# Patient Record
Sex: FEMALE | Race: WHITE
[De-identification: names, ages, dates, MRNs, and addresses within clinical notes are randomized per-mention and may not be internally consistent; named-entity substitution may affect disease eponyms.]

---

## 2017-04-17 ENCOUNTER — HOSPITAL ENCOUNTER (OUTPATIENT)
Dept: HOSPITAL 35 - PAIN | Age: 52
Discharge: HOME | End: 2017-04-17
Attending: ANESTHESIOLOGY
Payer: COMMERCIAL

## 2017-04-17 VITALS — WEIGHT: 165.6 LBS | HEIGHT: 64.02 IN | BODY MASS INDEX: 28.27 KG/M2

## 2017-04-17 VITALS — SYSTOLIC BLOOD PRESSURE: 142 MMHG | DIASTOLIC BLOOD PRESSURE: 86 MMHG

## 2017-04-17 DIAGNOSIS — M54.16: Primary | ICD-10-CM

## 2017-04-17 DIAGNOSIS — M79.1: ICD-10-CM

## 2017-04-17 DIAGNOSIS — M25.571: ICD-10-CM

## 2017-12-11 ENCOUNTER — HOSPITAL ENCOUNTER (OUTPATIENT)
Dept: HOSPITAL 35 - PAIN | Age: 52
End: 2017-12-11
Attending: ANESTHESIOLOGY
Payer: COMMERCIAL

## 2017-12-11 VITALS — SYSTOLIC BLOOD PRESSURE: 128 MMHG | DIASTOLIC BLOOD PRESSURE: 80 MMHG

## 2017-12-11 VITALS — WEIGHT: 165 LBS | HEIGHT: 64.02 IN | BODY MASS INDEX: 28.17 KG/M2

## 2017-12-11 DIAGNOSIS — M54.16: Primary | ICD-10-CM

## 2017-12-11 DIAGNOSIS — M25.571: ICD-10-CM

## 2017-12-11 DIAGNOSIS — Z79.899: ICD-10-CM

## 2018-07-20 ENCOUNTER — HOSPITAL ENCOUNTER (OUTPATIENT)
Dept: HOSPITAL 35 - PAIN | Age: 53
End: 2018-07-20
Payer: COMMERCIAL

## 2018-07-20 VITALS — HEIGHT: 64.02 IN | BODY MASS INDEX: 27.93 KG/M2 | WEIGHT: 163.6 LBS

## 2018-07-20 VITALS — DIASTOLIC BLOOD PRESSURE: 93 MMHG | SYSTOLIC BLOOD PRESSURE: 136 MMHG

## 2018-07-20 DIAGNOSIS — M54.5: ICD-10-CM

## 2018-07-20 DIAGNOSIS — E03.9: Primary | ICD-10-CM

## 2018-07-20 DIAGNOSIS — M79.1: ICD-10-CM

## 2019-02-08 ENCOUNTER — HOSPITAL ENCOUNTER (OUTPATIENT)
Dept: HOSPITAL 35 - PAIN | Age: 54
End: 2019-02-08
Payer: COMMERCIAL

## 2019-02-08 VITALS — DIASTOLIC BLOOD PRESSURE: 83 MMHG | SYSTOLIC BLOOD PRESSURE: 128 MMHG

## 2019-02-08 VITALS — WEIGHT: 164 LBS | HEIGHT: 64.02 IN | BODY MASS INDEX: 28 KG/M2

## 2019-02-08 DIAGNOSIS — E03.9: ICD-10-CM

## 2019-02-08 DIAGNOSIS — M62.838: ICD-10-CM

## 2019-02-08 DIAGNOSIS — Z80.8: ICD-10-CM

## 2019-02-08 DIAGNOSIS — M54.16: Primary | ICD-10-CM

## 2019-02-08 DIAGNOSIS — Z79.899: ICD-10-CM

## 2019-02-08 NOTE — NUR
Pain Clinic Assessment:
 
1. History of Osteoarthritis:
Not Applicable
   History of Rheumatoid Arthritis:
Not Applicable
 
2. Height: 5 ft. 4 in. 162.6 cm.
   Weight: 164.0 lb.  oz. 74.390 kg.
   Patient's BMI: 28.1
 
3. Vital Signs:
   BP: 128/83 Pulse: 86 Resp: 14
   Temp:  02 Sat: 99 ECG Mon:
 
4. Pain Intensity: 5-6 TODAY
 
5. Fall Risk:
   Dizziness: N  Needs help standing or walking: N
   Fallen in the last 3 months: N
   Fall risk comments:
 
 
6. Patient on Blood Thinner: None
 
7. History of Hypertension: N
 
8. Opioid Therapy greater than 6 weeks: Y
   Opiate Contract Signed:
 
9. Risk Assessment Tool Provided: LOW RISK 1/3
 
10. Functional Assessment Tool: 43/70
 
11. Recreational Drug Use: Never Drug Type:
    Tobacco Use: Never Smoker Tobacco Type:
       Amount or Packs/day:  How Many Years:
    Alcohol Use: Yes  Frequency:  Quant:

## 2019-02-13 NOTE — HPC
[facility]
[address]
[place]   [address]                     PAIN MANAGEMENT CONSULTATION  
_______________________________________________________________________________
 
Name:       URBI LEVINEANGELA            Room #:                     REG YURI 
ADELINA#:      [id number]                       Account #:      [id number]  
Admission:  02/08/19    Attend Phys:    MAYUR Best MD    
Discharge:              Date of Birth:  [birth year]  
                                                          Report #: [id number]
                                                                    [id number]   
_______________________________________________________________________________
THIS REPORT FOR:   //name//                          
 
CC: DILCIA Best
    Physician staff
 
DATE OF SERVICE:  02/08/2019
 
 
CHIEF COMPLAINT:  Here for medication renewal.  I am having pain down in my
sciatic nerve.
 
FOLLOWUP HISTORY:  The patient is a 53-year-old female.  She has been followed
in the pain clinic because of chronic pain.  She has been experiencing her low
back and has noted increased pain in the right groin area.  Pain is radiating
down into the buttocks, posterior thigh and down into the lateral portion of her
foot.  Describes as a burning, constant, shooting, rates it as a 5-6 at this
point.  Pain is exacerbated with prolonged sitting, walking or lying on her left
side, improves with stretching as well as with her medications.  Feels that her
medications of tramadol, cyclobenzaprine, Voltaren gel and Mobic continue to be
efficacious.  She would like to have these medications renewed.  She has been
following her 's health is quite closely.  She developed a nodule in the
roof of his mouth had a positive nodule on his neck.  She received chemotherapy
for a number of months for this.  Things at this point seem to be doing
reasonably well.
 
ALLERGIES:  No known drug allergies.
 
CURRENT MEDICATIONS:  Flexeril 10 mg b.i.d., tramadol 50 mg, meloxicam 15 mg,
Voltaren 100 mg t.i.d., Synthroid 0.088 mcg, Ambien 12.5 mg, triamcinolone nasal
spray, Claritin 10 mg, vitamin B12 100 mcg, Effexor 150 mg.
 
PAIN CLINIC ASSESSMENT/PQRS:
1.  The patient is not being treated for osteoarthritis.  She has not been
treated for rheumatoid arthritis.
2.  Height 5 feet 4 inches, weight 164 pounds, BMI is 28.
3.  Vital signs:  Blood pressure 128/83, pulse 86, respiratory rate 14, room air
saturation 99%.
4.  Pain intensity 5-6/10.
5.  Fall risk.  The patient has not fallen in the last 3 months.
6.  Blood thinner.  The patient is not on a blood thinning medication.
7.  Hypertension.  The patient is not being treated for hypertension.
8.  Opioid greater than 6 weeks.  The patient received some medications and use
tramadol to help control her pain.
9.  Risk assessment tool 1/3 low risk for opioid use.
10.  Functional assessment tool 43/70, recreational drug use.  The patient
 
 
 
[place] [address]                     PAIN MANAGEMENT CONSULTATION  
_______________________________________________________________________________
 
Name:       RUBI LEVINE            Room #:                     REG [facility] 
ADELINA#:      [id number]                       Account #:      [id number]  
Admission:  02/08/19    Attend Phys:    MAYUR Best MD    
Discharge:              Date of Birth:  [birth year]  
                                                          Report #: [id number]
                                                                    [id number]   
_______________________________________________________________________________
denies use of recreational drugs.
11.  Tobacco:  The patient has never smoked.
12.  Alcohol:  The patient drinks alcoholic beverages on rare occasion.
 
PHYSICAL EXAMINATION:
GENERAL:  The patient is a well-developed, well-nourished white female.  Appears
her stated age.  She is alert and oriented x 3.  Her affect is appropriate. 
Speech is fluent.
HEENT:  Normocephalic, atraumatic.  Extraocular eye muscles intact.  Sclerae
nonicteric.  Mucous membranes are moist.
NECK:  Without adenopathy or JVD.
HEART:  Regular.
ABDOMEN:  Nontender.  Bowel sounds present.
EXTREMITIES:  Upper extremity muscle strength is judged to be 5/5 for the major
muscle groups in the upper extremity.  The patient without significant
scoliosis, kyphosis or lordosis.  
She is experiencing pain and discomfort in the L5-S1 dermatomal distribution on
the right with pain that is radiating down her buttocks, posterior portion of
her thigh, calf area and lateral portion of her foot.
 
IMPRESSION:
1.  History of lumbar radicular pain, L5-S1 dermatomal distribution.
2.  Myofascial pain involving the right ankle.
3.  Muscle spasms.
4.  Hypothyroidism.
5.  Allergies.
6.   with recent chemotherapy for malignancy (lymphoma).
 
RECOMMENDATIONS:  We discussed treatment options with the patient.  At this
juncture, she continues to have pain, which is quite problematic involving the
right L5-S1 dermatomal distribution with sciatic pain.  She goes from a sitting
to a standing position during our interview.  States that she can only sit for
so long because of the pain and from her sciatic nerve.  At this juncture, we
will try a conservative approach.  The patient will be given a Medrol Dosepak,
which she will take at this juncture.  Hopefully, it helps with decreasing the
pain and discomfort.  If she should need in the future, she could return at
which time we could proceed with an epidural steroid injection to help quell the
sciatic pain involving her low back.  A script for her medications have been
rewritten.  She will continue with tramadol 50 mg one p.o. q.6 hours p.r.n.,
cyclobenzaprine 60 tablets 10 mg 1 p.o. b.i.d., Voltaren 100 mg topical t.i.d.,
meloxicam 15 mg daily.  We would like to thank you for letting us participate in
her care.  We hope she continues to improve.
 
 
  <ELECTRONICALLY SIGNED>
   By: MAYUR Best MD            
  02/13/19     0813
D: 02/08/19 1450                           _____________________________________
T: 02/09/19 0509                           MAYUR Best MD              /nt

## 2019-09-11 ENCOUNTER — HOSPITAL ENCOUNTER (OUTPATIENT)
Dept: HOSPITAL 35 - PAIN | Age: 54
End: 2019-09-11
Attending: CLINICAL NURSE SPECIALIST
Payer: COMMERCIAL

## 2019-09-11 VITALS — DIASTOLIC BLOOD PRESSURE: 84 MMHG | SYSTOLIC BLOOD PRESSURE: 127 MMHG

## 2019-09-11 VITALS — HEIGHT: 64.02 IN | WEIGHT: 154.6 LBS | BODY MASS INDEX: 26.4 KG/M2

## 2019-09-11 DIAGNOSIS — E03.9: ICD-10-CM

## 2019-09-11 DIAGNOSIS — M54.12: Primary | ICD-10-CM

## 2019-09-11 DIAGNOSIS — M79.18: ICD-10-CM

## 2019-09-11 DIAGNOSIS — Z79.899: ICD-10-CM

## 2019-09-11 DIAGNOSIS — M54.16: ICD-10-CM

## 2019-09-11 NOTE — NUR
Pain Clinic Assessment:
 
1. History of Osteoarthritis:
Not Applicable
   History of Rheumatoid Arthritis:
Not Applicable
 
2. Height: 5 ft. 4 in. 162.6 cm.
   Weight: 154.6 lb.  oz. 70.126 kg.
   Patient's BMI: 26.5
 
3. Vital Signs:
   BP: 127/84 Pulse: 84 Resp: 18
   Temp:  02 Sat: 100 ECG Mon:
 
4. Pain Intensity: 6 OVERALL 10 WITH LIFTING
 
5. Fall Risk:
   Dizziness: N  Needs help standing or walking: N
   Fallen in the last 3 months: N
   Fall risk comments:
 
 
6. Patient on Blood Thinner: None
 
7. History of Hypertension: N
 
8. Opioid Therapy greater than 6 weeks: Y
   Opiate Contract Signed:
 
9. Risk Assessment Tool Provided: LOW RISK 1/3
 
10. Functional Assessment Tool: 43/70
 
11. Recreational Drug Use: Never Drug Type:
    Tobacco Use: Never Smoker Tobacco Type:
       Amount or Packs/day:  How Many Years:
    Alcohol Use: Yes  Frequency:  Quant:

## 2020-03-13 ENCOUNTER — HOSPITAL ENCOUNTER (OUTPATIENT)
Dept: HOSPITAL 35 - PAIN | Age: 55
End: 2020-03-13
Attending: CLINICAL NURSE SPECIALIST
Payer: COMMERCIAL

## 2020-03-13 VITALS — HEIGHT: 64.02 IN | BODY MASS INDEX: 26.29 KG/M2 | WEIGHT: 154 LBS

## 2020-03-13 DIAGNOSIS — M54.16: Primary | ICD-10-CM

## 2020-03-13 DIAGNOSIS — Z79.891: ICD-10-CM

## 2020-03-13 DIAGNOSIS — Z79.899: ICD-10-CM

## 2020-03-13 DIAGNOSIS — M62.838: ICD-10-CM

## 2020-03-13 NOTE — NUR
Pain Clinic Assessment:
 
1. History of Osteoarthritis:
NONE
   History of Rheumatoid Arthritis:
NONE
 
2. Height:  ft.  in.  cm.
   Weight:  lb.  oz.  kg.
   Patient's BMI:
 
3. Vital Signs:
   BP:  Pulse:  Resp:
   Temp:  02 Sat:  ECG Mon:
 
4. Pain Intensity: 6
 
5. Fall Risk:
   Dizziness:   Needs help standing or walking:
   Fallen in the last 3 months:
   Fall risk comments:
 
 
6. Patient on Blood Thinner: None
 
7. History of Hypertension: N
 
8. Opioid Therapy greater than 6 weeks: Y
   Opiate Contract Signed:
 
9. Risk Assessment Tool Provided: LOW RISK 1/3
 
10. Functional Assessment Tool: 43/70
 
11. Recreational Drug Use: Never Drug Type:
    Tobacco Use: Never Smoker Tobacco Type:
       Amount or Packs/day:  How Many Years:
    Alcohol Use: Yes  Frequency: Weekly Quant:

## 2020-03-13 NOTE — HPC
[facility]
[address]
[place]   [address]                     PAIN MANAGEMENT CONSULTATION  
_______________________________________________________________________________
 
Name:       RUBI LEVINE            Room #:                     REG [facility].#:      [id number]                       Account #:      [id number]  
Admission:  03/13/20    Attend Phys:    Darcy Nair     
Discharge:              Date of Birth:  [birth year]  
                                                          Report #: [id number]
                                                                    [id number]   
_______________________________________________________________________________
THIS REPORT FOR:  
 
cc:  DILCIA KNAPP                  
     Physician not on staff                                               
     Darcy Nair                                            ~
CC: Darcy KNAPP
    Physician staff
 
DATE OF SERVICE:  03/13/2020
 
 
CHIEF COMPLAINT:  Low back pain, right leg pain, neck pain.
 
HISTORY OF PRESENT ILLNESS:  This is a 54-year-old female who returns to the
pain clinic today for refill of her medications.  Today, she is reporting no
pain, but her pain does increase as the day progresses when she is more active
and walking at work.  She has pain in her lower back that radiates into her
right leg and also neck pain that radiates into her right shoulder.  She said at
times it can be shooting, burning, cramping pain, but she feels that the
tramadol and her meloxicam are very beneficial as well as stretching.  She
denies any problems with constipation or daytime sleepiness as a result of her
medications.  She would like refills of her medications today.
 
The patient reports to our staff that she recently returned from [place] last
week from seeing her  who has been working there. She has no temperture
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 She has not had
any syptoms of Coronovirus.
 
ALLERGIES:  No known drug allergies.
 
CURRENT LIST OF MEDICATIONS:  Voltaren gel, meloxicam 15 mg, Flexeril, tramadol
50 mg p.r.n., Seroquel, Wellbutrin, estradiol, Synthroid, Ambien, Claritin and
vitamin B12.
 
PQRS:
1.  She is not being treated for osteoarthritis or rheumatoid arthritis.
2.  Height is 5 feet 4 inches, temperature was 98.1.  Pain score is 6.
3.  Fall risk.  Denies dizziness, does not need help walking or standing, has
not fallen in the last 3 months.  The patient is not on any blood thinners or
medicines for hypertension.  Her opioid therapy is greater than 6 weeks;
therefore, an opioid signed contract is on the chart.  Risk assessment tool is
low.  Functional assessment is 43/70.
4.  Recreational drug use, she denies.  She is not a smoker and occasionally
drinks alcohol.
 
 
 
 
[address]   [address]                     PAIN MANAGEMENT CONSULTATION  
_______________________________________________________________________________
 
Name:       RUBI LEVINE JENNIFER            Room #:                     REG CLI 
[facility]#:      [id number]                       Account #:      [id number]  
Admission:  03/13/20    Attend Phys:    Darcy Nair     
Discharge:              Date of Birth:  [birth year]  
                                                          Report #: [id number]
                                                                    [id number]   
_______________________________________________________________________________
According to the prescription monitoring system, the patient is filling
appropriately for her medications.  She does not fill on a monthly basis since
she takes her tramadol very sparingly.  According to the CDC guidelines, she is
below 50 morphine mEq.
 
PHYSICAL EXAMINATION:
GENERAL:  This is a well-developed, well-nourished, well-hydrated 54-year-old
female who appears her stated age.  Her affect is appropriate.  She is alert and
orientated.
HEENT:  Normocephalic, atraumatic.  Extraocular eye muscles are intact.  Mucous
membranes are moist.
MUSCULOSKELETAL:  She is without significant kyphosis or lordosis.  She has
discomfort in the L5-S1 dermatomal distribution on her lower lumbar spine that
radiates into her right leg down the posterior aspect of her thigh.  She has
pain and tenderness in her neck, radiates down her right shoulder.  Her upper
extremity and lower extremity strength judged to be 5/5 in all major muscle
groups.  She walks with a normal gait.
 
IMPRESSION:
1.  History of lumbar radiculopathy at the L5-S1 dermatomal distribution.
2.  Muscle spasms.
3.  Complex medical management under terms of written opioid agreement.
 
We reviewed the fact that opiate medications are being used to provide analgesia
adequate to support activities of daily living, not attempting to achieve a
specific pain score on the 0-10 Visual Analog Scale.  The current opiate
medications are providing sufficient analgesia to allow the patient to
participate in activities of daily living.  The patient is not exhibiting any
aberrant behavior suggestive of drug diversion.  The patient is not having any
adverse reactions to medications.  The patient is not suffering from daytime
somnolence or mental acuity changes.  The patient is managing opiate-induced
constipation with appropriate over-the-counter agents and dietary
considerations.  The patient was counseled on concern for caution with operating
a motor vehicle while using opiate medications.
 
A physical exam was performed and the patient's functional status was evaluated.
 All patients with back pain were advised against the bed rest greater than 4
days and were advised to return to normal activities.  Pain score assessment was
noted and the treatment plan was reviewed with the patient.  All current
medications, both prescribed and OTC were reviewed and reconciled on the
electronic medical record.  Tobacco screening was accomplished and smoking
cessation was advised when indicated.  BMI was noted and diet/exercise
modification was recommended for all patients following outside normal
parameters.
 
I reviewed with the patient today [facility]
 
 
 
[address]   [address]                     PAIN MANAGEMENT CONSULTATION  
_______________________________________________________________________________
 
Name:       RUBI LEVINE            Room #:                     REG YURI SAHU#:      [id number]                       Account #:      [id number]  
Admission:  03/13/20    Attend Phys:    Darcy Nair     
Discharge:              Date of Birth:  [birth year]  
                                                          Report #: [id number]
                                                                    [id number]   
_______________________________________________________________________________
prescription medications, reviewed their responsibility to utilize medications
only as prescribed by the physician.  They are to seek and receive pain
medications only from 1 physician group ( [facility]).  They are to use 1
pharmacy and keep the clinic informed if they change pharmacies.  Their
responsibilities include making followup visits in a timely fashion and to avoid
abrupt discontinuation of medication usage.  Their responsibilities further
include bringing their medications (bottles from the pharmacy with residual
pills) to the visit for possible confirmation of pill counts and the patient
understands it is their responsibility to submit to random drug screens to
ensure both that the medications prescribed are present, and that no other
controlled substances are present.  All prescriptions provided today were
generated electronically.
 
PLAN:  We discussed treatment options with the patient today.  The patient finds
her medications very beneficial.  She is able to be quite active, working full
time as well as helping care for things around the house.  She finds the
meloxicam very beneficial and she does take tramadol, though not on a daily
basis.  We will refill her medications today for tramadol 50 mg #120 with 5
refills, meloxicam 50 mg, #30 with 5 refills and Flexeril 10 mg, #60 with 5
refills.  These will be sent electronically to her pharmacy.  The patient
reminded that she needs to return every 6 months for medication refills and
encouraged to call if she feels that she needs any steroid injections in the
future to help with her neck or back pain.
 
The patient is seen in collaboration with Dr. Alexandro Best.
 
 
 
 
 
 
 
 
 
 
 
 
 
 
 
 
 
 
 
  <ELECTRONICALLY SIGNED>
   By: Darcy Nair             
  03/13/20     1013
D: 03/13/20 0855                           _____________________________________
T: 03/13/20 0925                           Darcy Nair               /nt

## 2020-09-23 ENCOUNTER — HOSPITAL ENCOUNTER (OUTPATIENT)
Dept: HOSPITAL 35 - PAIN | Age: 55
End: 2020-09-23
Payer: COMMERCIAL

## 2020-09-23 VITALS — HEIGHT: 64.02 IN | BODY MASS INDEX: 27.83 KG/M2 | WEIGHT: 163 LBS

## 2020-09-23 VITALS — SYSTOLIC BLOOD PRESSURE: 128 MMHG | DIASTOLIC BLOOD PRESSURE: 80 MMHG

## 2020-09-23 DIAGNOSIS — E03.9: ICD-10-CM

## 2020-09-23 DIAGNOSIS — Z79.899: ICD-10-CM

## 2020-09-23 DIAGNOSIS — Z88.8: ICD-10-CM

## 2020-09-23 DIAGNOSIS — M62.838: Primary | ICD-10-CM

## 2020-09-23 DIAGNOSIS — Z87.39: ICD-10-CM

## 2020-09-23 NOTE — HPC
[facility]
[address]
[place]   [address]                     PAIN MANAGEMENT CONSULTATION  
_______________________________________________________________________________
 
Name:       RUBI LEVINE            Room #:                     REG YURI ROBBINSDariela#:      [id number]                       Account #:      [id number]  
Admission:  09/23/20    Attend Phys:    MAYUR Best MD    
Discharge:              Date of Birth:  [birth year]  
                                                          Report #: [id number]
                                                                    [id number]   
_______________________________________________________________________________
CC: DILCIA Best
    Physician staff
 
DATE OF SERVICE:  09/23/2020
 
 
CHIEF COMPLAINT:  Increased back pain and spasms after doing some yard work.
 
HISTORY:  The patient is a 55-year-old female who has been followed in the Pain
Clinic.  She has pain in her back and noticed some increased pain and discomfort
radiating down into her right leg.  She has suffered from lumbar radiculopathy
in the past.  She has noticed worsening of pain since working out in her yard. 
She rates her pain as a 4/10.  Pain is worse with sitting and walking.  Pain
improves with stretching as well as with medication.  She has used a Medrol
Dosepak in the past.  She would like to try an additional packet of prednisone
to help with the pain.  She states that her 's health is reasonably
stable at this juncture.
 
ALLERGIES:  No known drug allergies.
 
CURRENT MEDICATIONS:  Flexeril 10 mg 1 p.o. b.i.d., tramadol 50 mg, meloxicam 15
mg, Voltaren gel 100 mg t.i.d., Synthroid 0.088 mcg, Ambien 12.5 mg,
triamcinolone nasal spray, Claritin 10 mg, vitamin B12 of 100 mcg, and Effexor
150 mg.
 
PAIN CLINIC ASSESSMENT AND PQRS:
1.  The patient is not being treated for osteoarthritis.  She is not being
treated for rheumatoid arthritis.
2.  Height 5 feet 4 inches, weight 163 pounds, BMI is 28.
3.  Vital signs:  Blood pressure 128/80, pulse 87, respiratory rate 14, and room
air saturation is 100%.
4.  Pain intensity 4/10.
5.  Fall risk.  The patient has not fallen in the last 3 months.
6.  Blood thinner.  The patient is not on a blood thinning medication.
7.  Hypertension.  The patient is not being treated for hypertension.
8.  Opioids greater than 6 weeks.  The patient receives medications from the
Pain Clinic.
9.  Risk assessment tool, low for opioid use.
10.  Recreational drug use.  The patient denies.
11.  Functional assessment tool 43/70.
12.  Tobacco:  The patient has never smoked.
13.  Alcohol:  The patient occasionally drinks alcoholic beverages.
 
PHYSICAL EXAMINATION:
GENERAL:  The patient is a well-developed, well-nourished white female.  Appears
 
her stated age.  She is alert and oriented x 3.  Her affect is appropriate. 
Speech is fluent.
HEENT:  Normocephalic, atraumatic.  Extraocular eye muscles intact.  Sclerae are
nonicteric.  The patient is wearing a facial covering.
NECK:  Without adenopathy or JVD.
HEART:  Regular rate.
ABDOMEN:  Nontender.  Bowel sounds present.
EXTREMITIES:  Upper extremity muscle strength judged to be 5/5 for the major
muscle groups in the upper extremity.
MUSCULOSKELETAL:  The patient without significant scoliosis, kyphosis or
lordosis.  The patient is experiencing some pain and discomfort in the L5-S1
distribution.  His pain is radiating down into her buttocks and into the
posterior portion of her thigh, calf and into her foot.
 
IMPRESSION:
1.  History of lumbar radiculopathy and pain in the L5-S1 with recurrence after
working out in the yard.
2.  Myofascial pain involving the right ankle.
3.  Muscle spasms.
4.  Hypothyroidism.
5.  Allergies.
6.   continues to be treated for lymphoma.
 
RECOMMENDATIONS:  We discussed treatment options with the patient.  At this
juncture, she feels that the steroid medications have been helpful.  We will
provide the patient with a Medrol Dosepak will be sent to her pharmacy.  The
patient will also continue with hydrocodone 5/325 one p.o. as directed.  She
will also continue with Flexeril 10 mg b.i.d.  She will use Elavil 25 mg up to 2
tablets at bedtime.  She will call us if she has any concerns.
 
We would like to thank you for letting us participate in her care.  We hope she
continues to improve.
 
 
 
 
 
 
 
 
 
 
 
 
 
 
 
 
 
 
 
 
 
 
 
 
 
 
                         
   By:                               
                   
D: 10/05/20 1314                           _____________________________________
T: 10/06/20 0240                           MAYUR Best MD              /nt

## 2020-09-23 NOTE — NUR
Pain Clinic Assessment:
 
1. History of Osteoarthritis:
NONE
   History of Rheumatoid Arthritis:
NONE
 
2. Height: 5 ft. 4 in. 162.6 cm.
   Weight: 163.0 lb.  oz. 73.936 kg.
   Patient's BMI: 28.0
 
3. Vital Signs:
   BP: 128/80 Pulse: 87 Resp: 14
   Temp:  02 Sat: 100 ECG Mon:
 
4. Pain Intensity: 4
 
5. Fall Risk:
   Dizziness: N  Needs help standing or walking: N
   Fallen in the last 3 months: N
   Fall risk comments:
 
 
6. Patient on Blood Thinner: None
 
7. History of Hypertension: N
 
8. Opioid Therapy greater than 6 weeks: Y
   Opiate Contract Signed:
 
9. Risk Assessment Tool Provided: LOW RISK 1
 
10. Functional Assessment Tool: 43/70
 
11. Recreational Drug Use: Never Drug Type:
    Tobacco Use: Never Smoker Tobacco Type:
       Amount or Packs/day:  How Many Years:
    Alcohol Use: Yes  Frequency:  Quant:

## 2021-04-21 ENCOUNTER — HOSPITAL ENCOUNTER (OUTPATIENT)
Dept: HOSPITAL 35 - PAIN | Age: 56
End: 2021-04-21
Attending: CLINICAL NURSE SPECIALIST
Payer: COMMERCIAL

## 2021-04-21 VITALS — BODY MASS INDEX: 28.07 KG/M2 | HEIGHT: 64.02 IN | WEIGHT: 164.4 LBS

## 2021-04-21 VITALS — DIASTOLIC BLOOD PRESSURE: 94 MMHG | SYSTOLIC BLOOD PRESSURE: 150 MMHG

## 2021-04-21 DIAGNOSIS — M79.10: ICD-10-CM

## 2021-04-21 DIAGNOSIS — G89.29: ICD-10-CM

## 2021-04-21 DIAGNOSIS — M54.16: Primary | ICD-10-CM

## 2021-04-21 DIAGNOSIS — E03.9: ICD-10-CM

## 2021-04-21 DIAGNOSIS — F11.20: ICD-10-CM

## 2021-04-21 NOTE — NUR
Pain Clinic Assessment:
 
1. History of Osteoarthritis:
LUMBAR SPINE
   History of Rheumatoid Arthritis:
NONE
 
2. Height: 5 ft. 4 in. 162.6 cm.
   Weight: 164.4 lb.  oz. 74.571 kg.
   Patient's BMI: 28.2
 
3. Vital Signs:
   BP: 150/94 Pulse: 95 Resp: 16
   Temp:  02 Sat: 100 ECG Mon:
 
4. Pain Intensity: 4
 
5. Fall Risk:
   Dizziness: N  Needs help standing or walking: N
   Fallen in the last 3 months: N
   Fall risk comments:
 
 
6. Patient on Blood Thinner: None
 
7. History of Hypertension: N
 
8. Opioid Therapy greater than 6 weeks: Y
   Opiate Contract Signed:
 
9. Risk Assessment Tool Provided: LOW RISK 1
 
10. Functional Assessment Tool: 43/70
 
11. Recreational Drug Use: Never Drug Type:
    Tobacco Use: Never Smoker Tobacco Type:
       Amount or Packs/day:  How Many Years:
    Alcohol Use: Yes  Frequency: Monthly Quant: 1

## 2021-04-29 NOTE — HPC
[facility]
[address]
[place]   [address]                     PAIN MANAGEMENT CONSULTATION  
_______________________________________________________________________________
 
Name:       RUBI LEVINE            Room #:                     [facility].#:      [id number]                       Account #:      [id number]  
Admission:  04/21/21    Attend Phys:    Darcy Nair     
Discharge:              Date of Birth:  [birth year]  
                                                          Report #: [id number]
                                                                    [id number] 
_______________________________________________________________________________
THIS REPORT FOR:  
 
cc:  LOPEZ SAUCEDO                  
     Physician not on staff                                               
     Darcy Nair                                            ~
DOC #: [id number]
 
cc:     ROB Best MD, Lopez Saucedo DO
 
DATE OF SERVICE: 04/21/2021
 
CHIEF COMPLAINT:  Low back pain with radiculopathy.
 
HISTORY OF PRESENT ILLNESS:  This is a 55-year-old pleasant female who is well 
known to the pain clinic.  Today, she is returning for a refill on her opioid 
medications and adjunct medications.  She finds these most beneficial in helping
control of her pain that she experiences from her mid back at the bra line level
to the lower back with occasional radiation into her legs.  So today she is not 
experiencing any radiculopathy.  She describes her pain as a sharp, shooting, 
stabbing sensation that is worse with prolonged sitting.  Though she did have an
episode this weekend with gardening that did increase her pain quite 
significantly.  This caused severe muscle spasms.  Overall, she believes,
walking and stretching have been beneficial as well as her medications.  We
typically see the patient on an every month basis for her medication refills. 
She denies any daytime somnolence or constipation as a result of these
medications.
 
ALLERGIES:  No known drug allergies.
 
MEDICATIONS:  Current list of medications:  Lunesta, Flexeril, meloxicam, 
tramadol 50 mg p.r.n., amitriptyline, hydrocodone, Zetia, Voltaren gel, 
Seroquel, Wellbutrin, estradiol, Synthroid, Claritin, and vitamin B12.
 
PQRS:
1.  She has arthritic changes, osteoarthritis in her lumbar spine.  Denies any 
rheumatoid arthritis.
2.  Height is 5 feet 4 inches, weight is 164, BMI is 28.
3.  Vital signs 150/94, pulse is 95, respirations 16, oxygen sat is 100.
4.  Pain score is 4/10.
5.  Denies dizziness.  Does not need help walking or standing.  Has not fallen 
in the last 3 months.
6.  The patient is not on any blood thinners, but does take medicine for 
hypertension.
7.  Opiate therapy is greater than 6 weeks.  Therefore, an opiate signed 
contract is on the chart.  Risk assessment is low.  Functional assessment is 43.
8.  Recreational drug use, she denies.  She is not a smoker and occasionally 
 
 
 
[address]   [address]                     PAIN MANAGEMENT CONSULTATION  
_______________________________________________________________________________
 
Name:       JULIANNARUBI JENNIFER            Room #:                     [facility] 
ADELINA#:      [id number]                       Account #:      [id number]  
Admission:  04/21/21    Attend Phys:    Darcy Nair     
Discharge:              Date of Birth:  [birth year]  
                                                          Report #: [id number]
                                                                    [id number] 
_______________________________________________________________________________
drinks alcohol.
 
According to the prescription monitoring, she is feeling in a timely fashion.  
Morphine milliequivalent is 50 MME or less.
 
PHYSICAL EXAMINATION:
GENERAL:  This is a well-developed, well-nourished white female who appears her 
of stated age, rating her pain score at 4/10.  Her affect is appropriate and her
speech is fluent.
HEENT:  Normocephalic, atraumatic.  Extraocular eye muscles are intact.  She is 
wearing a facial mask.
NECK:  Without adenopathy or JVD.
EXTREMITIES:  Upper extremity strength judged to be symmetrical at 5/5.
MUSCULOSKELETAL:  The patient is without scoliosis or kyphosis or lordosis.  She
has discomfort in her mid thoracic region and lower back following L5-S1 
dermatomal distribution that radiates into her buttocks, but not down her legs. 
Today tightness in her lumbar paraspinal musculature.
 
IMPRESSION:
1.  Lumbar radiculopathy with a history of L5-S1.
2.  Myofascial pain.
3.  Hypothyroidism.
4.  Chronic low back and thoracic pain.
5.  Complex medical management under terms of written opioid agreement.
 
PLAN:
1.  We discussed treatment options with the patient today.  The patient finds 
her medication regimen is very beneficial allowing her to work full time with 
minimal pain.  Does have flares with increased activity.  The patient is 
requesting refills of all of her medication as well as a refill of her 
hydrocodone that she takes very sparingly.  Dr. Best provided her 30 pills in 
September and she has returned with one tablet left, I believe that we will be 
able to continue that for more severe pain issues.
2.  We will offer her Medrol Dosepak that she may take if she has a 
significant flare as well, keeping her from seeing us for epidurals, which she 
has had in the past and enabling her to continue to work.  Script sent today for
amitriptyline 50 mg tablets 1 tablet at bedtime, #30 with 5 refills, Flexeril 10
mg b.i.d., #45 with 5 refills, Meloxicam 15 mg #30 with 5 refills, Medrol 
Dosepak in case of increased pain, hydrocodone 5/325, #30 for severe pain and 
finally tramadol 50 mg q.i.d., #120 with 5 additional refills that will be sent 
by Dr. Alexandro Best.  The patient will return in 6 months or as needed.
 
Time spent with the patient in consultation, reviewing recent studies and 
clinical notes, physical examination and correlation of physical findings and 
medical documentation to determine treatment options is 12 minutes. Time spent 
in preparation for appointment, reviewing Prescription Monitoring system, 
 
 
 
[facility]
[address] [place]   [address]                     PAIN MANAGEMENT CONSULTATION  
_______________________________________________________________________________
 
Name:       RUBI LEVINE            Room #:                     [facility]..#:      [id number]                       Account #:      [id number]  
Admission:  04/21/21    Attend Phys:    Darcy Nair     
Discharge:              Date of Birth:  [birth year]  
                                                          Report #: [id number]
                                                                    [id number] 
_______________________________________________________________________________
reviewing previous records and treatment options and reviewing current 
medications is 5 minutes.  Time spent preparing and sending electronic 
prescriptions with collaborating physician, Dr. Alexandro Best, 
documentation of visit and plan of treatment is 5 minute.  Total time is 22 
minutes.
 
MARAH Lezama/[facility]
 
D: 04/21/2021 02:50 PM
T: 04/22/2021 03:23 AM
 
 
 
 
 
 
 
 
 
 
 
 
 
 
 
 
 
 
 
 
 
 
 
 
 
 
 
 
 
 
 
 
 
  <ELECTRONICALLY SIGNED>
   By: Darcy Nair             
  04/29/21     1530
D: 04/21/21 1350                           _____________________________________
T: 04/22/21 0223                           Darcy Nair               /nt

## 2021-11-17 ENCOUNTER — HOSPITAL ENCOUNTER (OUTPATIENT)
Dept: HOSPITAL 35 - PAIN | Age: 56
End: 2021-11-17
Attending: CLINICAL NURSE SPECIALIST
Payer: COMMERCIAL

## 2021-11-17 VITALS — SYSTOLIC BLOOD PRESSURE: 126 MMHG | DIASTOLIC BLOOD PRESSURE: 81 MMHG

## 2021-11-17 VITALS — BODY MASS INDEX: 27.79 KG/M2 | WEIGHT: 162.8 LBS | HEIGHT: 64.02 IN

## 2021-11-17 DIAGNOSIS — G89.29: ICD-10-CM

## 2021-11-17 DIAGNOSIS — M79.18: ICD-10-CM

## 2021-11-17 DIAGNOSIS — Z79.899: ICD-10-CM

## 2021-11-17 DIAGNOSIS — M54.16: Primary | ICD-10-CM

## 2021-11-17 NOTE — NUR
Pain Clinic Assessment:
 
1. History of Osteoarthritis:
LUMBAR SPINE
   History of Rheumatoid Arthritis:
NONE
 
2. Height: 5 ft. 4 in. 162.6 cm.
   Weight: 162.8 lb.  oz. 73.846 kg.
   Patient's BMI: 27.9
 
3. Vital Signs:
   BP: 126/81 Pulse: 90 Resp: 16
   Temp:  02 Sat: 98 ECG Mon:
 
4. Pain Intensity: 9-10
 
5. Fall Risk:
   Dizziness: N  Needs help standing or walking: N
   Fallen in the last 3 months: N
   Fall risk comments:
 
 
6. Patient on Blood Thinner: None
 
7. History of Hypertension: N
 
8. Opioid Therapy greater than 6 weeks: Y
   Opiate Contract Signed:
 
9. Risk Assessment Tool Provided: LOW RISK 1
 
10. Functional Assessment Tool: 43/70
 
11. Recreational Drug Use: Never Drug Type:
    Tobacco Use: Never Smoker Tobacco Type:
       Amount or Packs/day:  How Many Years:
    Alcohol Use: Yes  Frequency:  Quant: